# Patient Record
(demographics unavailable — no encounter records)

---

## 2025-05-10 NOTE — REVIEW OF SYSTEMS
[Itching] : Itching [Skin Rash] : skin rash [Negative] : Psychiatric [Mole Changes] : no mole changes [Nail Changes] : no nail changes [Hair Changes] : no hair changes

## 2025-05-10 NOTE — PLAN
[FreeTextEntry1] : plan described above see rheumatology if all labs negative and rash persists f/u with Dr Scott for yearly physical

## 2025-05-10 NOTE — HISTORY OF PRESENT ILLNESS
[FreeTextEntry8] : 72F here in office for c/o pruritic rash. States she has noticed this rash since November. She describes it as very itchy on her arms and legs. Has seen dermatology and allergy. Had a lesion biopsied and it was described as likely drug rash. Differential from derm was scabies, drug rash, urticaria. States she has tried antihistamines with no relief. Has also tried topical betamethasone and triamcinolone. States no new detergents, products, medications, foods. No pets in the house. States she called her pharmacy to see if there was a change in  of meds and there was none. She was seen by allergy had skin testing and all was negative. Denies prodromal URI symptoms, lesions to mouth/palms/soles, shortness of breath, chest pain, difficulty swallowing/tongue swelling. Nothing mitigates or exacerbates the symptoms. No abdominal pain. No one else in home has rash. She was told to stop antihistamine as she is having complication of cataract surgery. Denies any tick bites, myalgias, arthralgias.

## 2025-05-10 NOTE — PHYSICAL EXAM
[No Acute Distress] : no acute distress [Well Nourished] : well nourished [Well Developed] : well developed [Normal Sclera/Conjunctiva] : normal sclera/conjunctiva [PERRL] : pupils equal round and reactive to light [Normal Outer Ear/Nose] : the outer ears and nose were normal in appearance [Normal Oropharynx] : the oropharynx was normal [No Lymphadenopathy] : no lymphadenopathy [No Respiratory Distress] : no respiratory distress  [No Accessory Muscle Use] : no accessory muscle use [Clear to Auscultation] : lungs were clear to auscultation bilaterally [Normal Rate] : normal rate  [Regular Rhythm] : with a regular rhythm [Normal S1, S2] : normal S1 and S2 [No Edema] : there was no peripheral edema [Soft] : abdomen soft [Non-distended] : non-distended [Normal Posterior Cervical Nodes] : no posterior cervical lymphadenopathy [Normal Anterior Cervical Nodes] : no anterior cervical lymphadenopathy [No Spinal Tenderness] : no spinal tenderness [No Joint Swelling] : no joint swelling [Grossly Normal Strength/Tone] : grossly normal strength/tone [Coordination Grossly Intact] : coordination grossly intact [Normal Gait] : normal gait [Normal Affect] : the affect was normal [Normal Insight/Judgement] : insight and judgment were intact [de-identified] : scabbed over maculopapular rash to arms or legs. No vesicles, no absesses. No streaking/warmth/purulence noted. No bullae. No ulcerations.

## 2025-05-10 NOTE — PHYSICAL EXAM
[No Acute Distress] : no acute distress [Well Nourished] : well nourished [Well Developed] : well developed [Normal Sclera/Conjunctiva] : normal sclera/conjunctiva [PERRL] : pupils equal round and reactive to light [Normal Outer Ear/Nose] : the outer ears and nose were normal in appearance [Normal Oropharynx] : the oropharynx was normal [No Lymphadenopathy] : no lymphadenopathy [No Respiratory Distress] : no respiratory distress  [No Accessory Muscle Use] : no accessory muscle use [Clear to Auscultation] : lungs were clear to auscultation bilaterally [Normal Rate] : normal rate  [Regular Rhythm] : with a regular rhythm [Normal S1, S2] : normal S1 and S2 [No Edema] : there was no peripheral edema [Soft] : abdomen soft [Non-distended] : non-distended [Normal Posterior Cervical Nodes] : no posterior cervical lymphadenopathy [Normal Anterior Cervical Nodes] : no anterior cervical lymphadenopathy [No Spinal Tenderness] : no spinal tenderness [No Joint Swelling] : no joint swelling [Grossly Normal Strength/Tone] : grossly normal strength/tone [Coordination Grossly Intact] : coordination grossly intact [Normal Gait] : normal gait [Normal Affect] : the affect was normal [Normal Insight/Judgement] : insight and judgment were intact [de-identified] : scabbed over maculopapular rash to arms or legs. No vesicles, no absesses. No streaking/warmth/purulence noted. No bullae. No ulcerations.